# Patient Record
Sex: MALE | Race: BLACK OR AFRICAN AMERICAN | NOT HISPANIC OR LATINO | URBAN - NONMETROPOLITAN AREA
[De-identification: names, ages, dates, MRNs, and addresses within clinical notes are randomized per-mention and may not be internally consistent; named-entity substitution may affect disease eponyms.]

---

## 2017-12-18 PROBLEM — H40.003: Noted: 2017-12-18

## 2017-12-18 PROBLEM — H26.493: Noted: 2017-12-18

## 2017-12-18 PROBLEM — E11.9: Noted: 2017-12-18

## 2019-12-23 ENCOUNTER — IMPORTED ENCOUNTER (OUTPATIENT)
Dept: URBAN - NONMETROPOLITAN AREA CLINIC 1 | Facility: CLINIC | Age: 84
End: 2019-12-23

## 2019-12-23 PROCEDURE — 92014 COMPRE OPH EXAM EST PT 1/>: CPT

## 2019-12-23 NOTE — PATIENT DISCUSSION
"DM s -Stressed the importance of keeping blood sugars under control blood pressure under control and weight normalization and regular visits with PCP. -Explained the possible effects of poorly controlled diabetes and the damage that diabetes can cause to ocular health. -Patient to check HgbA1C. Glaucoma Suspect-Based on Disc asym. - C/D's 0.5 OD 0.6 OS - IOP's today 16 OU -OCT ON and VF appears stable -Appears stable at this time.-Continue to monitor with exams and testing.s/p PCIOL-Stable PCIOL OU.-Monitor for PCO. Early PCO-Explained symptoms of advancing PCO. -Continue to monitor for now. Pt will notify us if any new symptoms develop. ANNE-Explained ANNE and associated symptoms.-Recommend increasing Omega 3s.-Pt to begin artificial tears OU QID PRN. Pt will contact us if this does not provide relief. Consider punctal plugs in that case. ""-Pt instructed to contact our office with any vision changes.; Dr's Notes: Sees whom ever is @ Dr. Jarrod Villafuerte -6/20/18 VF- 6/20/18"

## 2020-03-11 ENCOUNTER — IMPORTED ENCOUNTER (OUTPATIENT)
Dept: URBAN - NONMETROPOLITAN AREA CLINIC 1 | Facility: CLINIC | Age: 85
End: 2020-03-11

## 2020-03-11 NOTE — PATIENT DISCUSSION
"DM s DR-Stressed the importance of keeping blood sugars under control blood pressure under control and weight normalization and regular visits with PCP. -Explained the possible effects of poorly controlled diabetes and the damage that diabetes can cause to ocular health. -Patient to check HgbA1C. Glaucoma Suspect-Based on Disc asym. - C/D's 0.5 OD 0.6 OS - IOP's today 16 OU -OCT ON and VF appears stable -Appears stable at this time.-Continue to monitor with exams and testing.s/p PCIOL-Stable PCIOL OU.-Monitor for PCO. Early PCO-Explained symptoms of advancing PCO. -Continue to monitor for now. Pt will notify us if any new symptoms develop. ANNE-Explained ANNE and associated symptoms.-Recommend increasing Omega 3s.-Pt to begin artificial tears OU QID PRN. Pt will contact us if this does not provide relief. Consider punctal plugs in that case. ""-Pt instructed to contact our office with any vision changes.; Dr's Notes: Sees whom ever is @ Dr. Wisam Thomas office<br />ON OCT -6/20/18<br /> VF- 6/20/18<br />"

## 2022-04-10 ASSESSMENT — VISUAL ACUITY
OD_PH: 20/40
OD_SC: 20/25-3
OS_PH: 20/30
OS_SC: 20/40
OS_PH: 20/25
OS_CC: 20/60
OD_CC: 20/40-2

## 2022-04-10 ASSESSMENT — PACHYMETRY
OD_CT_UM: 449; ADJ: VTHIN
OD_CT_UM: 449; ADJ: VTHIN

## 2022-04-10 ASSESSMENT — TONOMETRY
OS_IOP_MMHG: 15
OD_IOP_MMHG: 15